# Patient Record
Sex: MALE | Race: WHITE | NOT HISPANIC OR LATINO | URBAN - METROPOLITAN AREA
[De-identification: names, ages, dates, MRNs, and addresses within clinical notes are randomized per-mention and may not be internally consistent; named-entity substitution may affect disease eponyms.]

---

## 2018-04-02 ENCOUNTER — APPOINTMENT (RX ONLY)
Dept: URBAN - METROPOLITAN AREA CLINIC 23 | Facility: CLINIC | Age: 18
Setting detail: DERMATOLOGY
End: 2018-04-02

## 2018-04-02 DIAGNOSIS — L53.8 OTHER SPECIFIED ERYTHEMATOUS CONDITIONS: ICD-10-CM

## 2018-04-02 PROBLEM — L30.9 DERMATITIS, UNSPECIFIED: Status: ACTIVE | Noted: 2018-04-02

## 2018-04-02 PROCEDURE — ? PATIENT SPECIFIC COUNSELING

## 2018-04-02 PROCEDURE — ? PRESCRIPTION

## 2018-04-02 PROCEDURE — ? DIAGNOSIS COMMENT

## 2018-04-02 PROCEDURE — ? SUNSCREEN RECOMMENDATIONS

## 2018-04-02 PROCEDURE — 99202 OFFICE O/P NEW SF 15 MIN: CPT

## 2018-04-02 RX ORDER — TRIAMCINOLONE ACETONIDE 1 MG/G
CREAM TOPICAL BID
Qty: 1 | Refills: 0 | Status: ERX | COMMUNITY
Start: 2018-04-02

## 2018-04-02 RX ADMIN — TRIAMCINOLONE ACETONIDE: 1 CREAM TOPICAL at 12:50

## 2018-04-02 NOTE — PROCEDURE: DIAGNOSIS COMMENT
Comment: Eric Owens presents today with his mother primarily for evaluation of a rash on the face that has been present over the last four months. Reportedly, he has a similar rash about 18 months prior that was felt to be consistent with SLE. To his knowledge, no biopsies or labs were performed to confirm the diagnosis. He has never seen a rheumatologist. \\n\\Ximena addition to the rash, he has noted occasional lower back pain, occasional shortness of breath (also has anxiety which he states may be causative), and intermittent chest pain. 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 He was recently in the ED where an EKG was performed and was reportedly normal. \\n\\nHe is currently on a 5 day course of prednisone. Last year he was treated with plaquenil which he tolerated, however, he ultimately discontinued it for unknown reasons.\\n\\nImpression:\\nExamination is concerning for a flare of SLE. I spoke with rheumatology today who graciously agreed to see him expeditiously. \\n\\nWill bring him back tomorrow for biopsy (routine histology and DIF). Will obtain basic labs, UA, FERMIN with IFA, MORALES, and anti dsDNA antibodies. \\n\\nWill treat topically with triamcinolone. Will also start plaquenil and low dose prednisone. Referral placed to rheumatology for management and further evaluation.
Detail Level: Simple

## 2018-04-02 NOTE — PROCEDURE: PATIENT SPECIFIC COUNSELING
Discussed referral to a rheumatologist. Will refer to Dr.Long Mei,, Fax#[phone]. Discuss Bx to rule out but will touch base with Dr.Long Mei first.
Detail Level: Zone

## 2018-04-02 NOTE — HPI: SKIN LESIONS
How Severe Is Your Skin Lesion?: moderate
treated_been_treated
Is This A New Presentation, Or A Follow-Up?: Skin Lesions
Additional History: Dr Kaur is GP, said he could use plaquenil or steroids. 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

## 2018-04-03 ENCOUNTER — APPOINTMENT (RX ONLY)
Dept: URBAN - METROPOLITAN AREA CLINIC 24 | Facility: CLINIC | Age: 18
Setting detail: DERMATOLOGY
End: 2018-04-03

## 2018-04-03 DIAGNOSIS — M32.10 SYSTEMIC LUPUS ERYTHEMATOSUS, ORGAN OR SYSTEM INVOLVEMENT UNSPECIFIED: ICD-10-CM

## 2018-04-03 PROBLEM — L30.9 DERMATITIS, UNSPECIFIED: Status: ACTIVE | Noted: 2018-04-03

## 2018-04-03 PROBLEM — L20.84 INTRINSIC (ALLERGIC) ECZEMA: Status: ACTIVE | Noted: 2018-04-03

## 2018-04-03 PROCEDURE — ? PRESCRIPTION

## 2018-04-03 PROCEDURE — ? BIOPSY BY PUNCH METHOD

## 2018-04-03 PROCEDURE — 11101: CPT

## 2018-04-03 PROCEDURE — 11100: CPT

## 2018-04-03 PROCEDURE — 99213 OFFICE O/P EST LOW 20 MIN: CPT | Mod: 25

## 2018-04-03 PROCEDURE — ? ADDITIONAL NOTES

## 2018-04-03 RX ORDER — PREDNISONE 10 MG/1
TABLET ORAL
Qty: 30 | Refills: 0 | Status: ERX | COMMUNITY
Start: 2018-04-03

## 2018-04-03 RX ORDER — HYDROXYCHLOROQUINE SULFATE 200 MG/1
TABLET ORAL BID
Qty: 60 | Refills: 1 | Status: ERX | COMMUNITY
Start: 2018-04-03

## 2018-04-03 RX ADMIN — PREDNISONE: 10 TABLET ORAL at 20:35

## 2018-04-03 RX ADMIN — HYDROXYCHLOROQUINE SULFATE: 200 TABLET ORAL at 17:22

## 2018-04-03 ASSESSMENT — LOCATION SIMPLE DESCRIPTION DERM: LOCATION SIMPLE: RIGHT CHEEK

## 2018-04-03 ASSESSMENT — LOCATION DETAILED DESCRIPTION DERM: LOCATION DETAILED: RIGHT CENTRAL MALAR CHEEK

## 2018-04-03 ASSESSMENT — LOCATION ZONE DERM: LOCATION ZONE: FACE

## 2018-04-03 NOTE — PROCEDURE: ADDITIONAL NOTES
Additional Notes: Plan : Order Tests. \\n	Test: 90325-8 - Comp C3 + C4 Pnl SerPl-mCnc\\n	Test: 25174-9 - CBC (hemogram) Bld Auto\\n	Test: 60587-5 - UA Microscopic Pnl #/area UrnS Auto\\n	Test: 1742-6 - ALT SerPl-cCnc\\n	Test: 1920-8 - AST SerPl-cCnc\\n	Test: 2160-0 - Creat SerPl-mCnc\\n	Test: 86017-2 - CBC W Auto Diff Bld\\n	Test: 09974-6 - dsDNA Ab Titr Ser\\n	Test: 98835-2 - MORALES Ab Pnl Ser\\n	Test: 74500-8 - FERMIN Pat Ser IF-Imp
Additional Notes: See note from 4/2 for full detailed report.

## 2018-04-03 NOTE — PROCEDURE: BIOPSY BY PUNCH METHOD
Wound Care: Petrolatum
Size Of Lesion In Cm (Optional): 0
Anesthesia Volume In Cc: 0.5
Anesthesia Type: 1% lidocaine with epinephrine
Billing Type: Third-Party Bill
Post-Care Instructions: I reviewed with the patient in detail post-care instructions. Patient is to keep the biopsy site dry overnight, and then apply bacitracin twice daily until healed. Patient may apply hydrogen peroxide soaks to remove any crusting.
Detail Level: Detailed
Dressing: bandage
Epidermal Sutures: 4-0 Prolene
Bill For Surgical Tray: no
Punch Size In Mm: 4
Biopsy Type: H and E
Notification Instructions: Patient will be notified of biopsy results. However, patient instructed to call the office if not contacted within 2 weeks.
Accession #: [id number]
Biopsy Type: DIF
Body Location Override (Optional - Billing Will Still Be Based On Selected Body Map Location If Applicable): right lateral malar cheek
Consent: Written consent was obtained and risks were reviewed including but not limited to scarring, infection, bleeding, scabbing, incomplete removal, nerve damage and allergy to anesthesia.
Hemostasis: None
Home Suture Removal Text: Patient was provided a home suture removal kit and will remove their sutures at home.  If they have any questions or difficulties they will call the office.
Suture Removal: 10 days
Suture Removal: 5 days

## 2018-04-06 ENCOUNTER — APPOINTMENT (RX ONLY)
Dept: URBAN - METROPOLITAN AREA CLINIC 23 | Facility: CLINIC | Age: 18
Setting detail: DERMATOLOGY
End: 2018-04-06

## 2018-04-06 DIAGNOSIS — M32.10 SYSTEMIC LUPUS ERYTHEMATOSUS, ORGAN OR SYSTEM INVOLVEMENT UNSPECIFIED: ICD-10-CM

## 2018-04-06 PROBLEM — F41.9 ANXIETY DISORDER, UNSPECIFIED: Status: ACTIVE | Noted: 2018-04-06

## 2018-04-06 PROCEDURE — 99024 POSTOP FOLLOW-UP VISIT: CPT

## 2018-04-06 PROCEDURE — ? ORDER TESTS

## 2018-04-06 NOTE — HPI: OTHER
Condition:: No visit today. Visit generated to add labs to previous visit.
Please Describe Your Condition:: Labs

## 2018-04-10 ENCOUNTER — APPOINTMENT (RX ONLY)
Dept: URBAN - METROPOLITAN AREA CLINIC 24 | Facility: CLINIC | Age: 18
Setting detail: DERMATOLOGY
End: 2018-04-10

## 2018-04-10 DIAGNOSIS — M32.10 SYSTEMIC LUPUS ERYTHEMATOSUS, ORGAN OR SYSTEM INVOLVEMENT UNSPECIFIED: ICD-10-CM

## 2018-04-10 DIAGNOSIS — Z48.01 ENCOUNTER FOR CHANGE OR REMOVAL OF SURGICAL WOUND DRESSING: ICD-10-CM

## 2018-04-10 PROBLEM — L30.9 DERMATITIS, UNSPECIFIED: Status: ACTIVE | Noted: 2018-04-10

## 2018-04-10 PROCEDURE — 99212 OFFICE O/P EST SF 10 MIN: CPT

## 2018-04-10 PROCEDURE — ? PRESCRIPTION

## 2018-04-10 PROCEDURE — ? TREATMENT REGIMEN

## 2018-04-10 PROCEDURE — ? ADDITIONAL NOTES

## 2018-04-10 PROCEDURE — ? SUTURE REMOVAL (GLOBAL PERIOD)

## 2018-04-10 RX ORDER — PIMECROLIMUS 10 MG/G
CREAM TOPICAL
Qty: 1 | Refills: 1 | Status: ERX | COMMUNITY
Start: 2018-04-10

## 2018-04-10 RX ADMIN — PIMECROLIMUS: 10 CREAM TOPICAL at 18:38

## 2018-04-10 ASSESSMENT — LOCATION SIMPLE DESCRIPTION DERM: LOCATION SIMPLE: RIGHT CHEEK

## 2018-04-10 ASSESSMENT — LOCATION DETAILED DESCRIPTION DERM: LOCATION DETAILED: RIGHT CENTRAL MALAR CHEEK

## 2018-04-10 ASSESSMENT — LOCATION ZONE DERM: LOCATION ZONE: FACE

## 2018-04-10 NOTE — PROCEDURE: ADDITIONAL NOTES
Additional Notes: Will have drawn today and already has order from 4/6/18. \\nPlan : Order Tests. \\n	Test: 65027-9 - FERMIN Pat Ser IF-Imp\\n	Test: 86902-3 - [place] 3 screen W Reflex-Imp
Additional Notes: Pending appointment with Dr.Long Valle 4/20/18. Labs reviewed with patient. Ddx includes SLE versus mixed connective tissue disease.

## 2018-04-10 NOTE — PROCEDURE: SUTURE REMOVAL (GLOBAL PERIOD)
Detail Level: Detailed
Add 00395 Cpt? (Important Note: In 2017 The Use Of 44076 Is Being Tracked By Cms To Determine Future Global Period Reimbursement For Global Periods): no

## 2018-04-10 NOTE — PROCEDURE: TREATMENT REGIMEN
Detail Level: Zone
Initiate Treatment: Elidel cream
Continue Regimen: TAC cream until Elidel is approved.\\nPlan : Prescription.  Plaquenil 200 mg tablet BID Sig: Take 1 po QD x 1 week and if tolerating well then BID.\\n prednisone 10 mg tablet  Sig: Take 1 po QD for rash.
Plan: Apply to Vaseline to crusted area on right cheek and apply SPF

## 2018-04-25 ENCOUNTER — APPOINTMENT (RX ONLY)
Dept: URBAN - METROPOLITAN AREA CLINIC 23 | Facility: CLINIC | Age: 18
Setting detail: DERMATOLOGY
End: 2018-04-25

## 2018-04-25 DIAGNOSIS — M35.1 OTHER OVERLAP SYNDROMES: ICD-10-CM | Status: IMPROVED

## 2018-04-25 DIAGNOSIS — L90.5 SCAR CONDITIONS AND FIBROSIS OF SKIN: ICD-10-CM

## 2018-04-25 PROBLEM — L30.9 DERMATITIS, UNSPECIFIED: Status: ACTIVE | Noted: 2018-04-25

## 2018-04-25 PROCEDURE — ? DIAGNOSIS COMMENT

## 2018-04-25 PROCEDURE — ? ADDITIONAL NOTES

## 2018-04-25 PROCEDURE — 99212 OFFICE O/P EST SF 10 MIN: CPT

## 2018-04-25 PROCEDURE — ? TREATMENT REGIMEN

## 2018-04-25 PROCEDURE — ? COUNSELING

## 2018-04-25 ASSESSMENT — LOCATION DETAILED DESCRIPTION DERM
LOCATION DETAILED: RIGHT SUPERIOR CENTRAL MALAR CHEEK
LOCATION DETAILED: LEFT LATERAL MALAR CHEEK
LOCATION DETAILED: RIGHT LATERAL MALAR CHEEK

## 2018-04-25 ASSESSMENT — LOCATION SIMPLE DESCRIPTION DERM
LOCATION SIMPLE: LEFT CHEEK
LOCATION SIMPLE: RIGHT CHEEK

## 2018-04-25 ASSESSMENT — LOCATION ZONE DERM: LOCATION ZONE: FACE

## 2018-04-25 NOTE — PROCEDURE: TREATMENT REGIMEN
Plan: apply SPF
Detail Level: Zone
Continue Regimen: Elidel cream,Plaquenil 200 mg tablet BID and prednisone taper. Following  Dr.Long Valle
Otc Regimen: Serica

## 2018-04-25 NOTE — PROCEDURE: DIAGNOSIS COMMENT
Comment: May refer to [facility] for scar version in the future if patient would like.
Detail Level: Simple

## 2021-01-28 ENCOUNTER — APPOINTMENT (RX ONLY)
Dept: URBAN - METROPOLITAN AREA CLINIC 23 | Facility: CLINIC | Age: 21
Setting detail: DERMATOLOGY
End: 2021-01-28

## 2021-01-28 DIAGNOSIS — K13.0 DISEASES OF LIPS: ICD-10-CM

## 2021-01-28 DIAGNOSIS — L81.4 OTHER MELANIN HYPERPIGMENTATION: ICD-10-CM

## 2021-01-28 DIAGNOSIS — L29.89 OTHER PRURITUS: ICD-10-CM

## 2021-01-28 DIAGNOSIS — L70.8 OTHER ACNE: ICD-10-CM

## 2021-01-28 DIAGNOSIS — L29.8 OTHER PRURITUS: ICD-10-CM

## 2021-01-28 DIAGNOSIS — D22 MELANOCYTIC NEVI: ICD-10-CM

## 2021-01-28 PROBLEM — F41.9 ANXIETY DISORDER, UNSPECIFIED: Status: ACTIVE | Noted: 2021-01-28

## 2021-01-28 PROBLEM — D22.5 MELANOCYTIC NEVI OF TRUNK: Status: ACTIVE | Noted: 2021-01-28

## 2021-01-28 PROBLEM — L70.0 ACNE VULGARIS: Status: ACTIVE | Noted: 2021-01-28

## 2021-01-28 PROBLEM — D22.4 MELANOCYTIC NEVI OF SCALP AND NECK: Status: ACTIVE | Noted: 2021-01-28

## 2021-01-28 PROCEDURE — ? PRESCRIPTION

## 2021-01-28 PROCEDURE — ? TREATMENT REGIMEN

## 2021-01-28 PROCEDURE — ? OTHER

## 2021-01-28 PROCEDURE — 99213 OFFICE O/P EST LOW 20 MIN: CPT

## 2021-01-28 PROCEDURE — ? COUNSELING

## 2021-01-28 RX ORDER — CLINDAMYCIN PHOSPHATE 10 MG/ML
LOTION TOPICAL
Qty: 1 | Refills: 10 | Status: ERX | COMMUNITY
Start: 2021-01-28

## 2021-01-28 RX ADMIN — CLINDAMYCIN PHOSPHATE: 10 LOTION TOPICAL at 00:00

## 2021-01-28 ASSESSMENT — LOCATION SIMPLE DESCRIPTION DERM
LOCATION SIMPLE: POSTERIOR NECK
LOCATION SIMPLE: RIGHT FOREARM
LOCATION SIMPLE: CHEST
LOCATION SIMPLE: GENITALIA
LOCATION SIMPLE: LEFT LOWER BACK
LOCATION SIMPLE: RIGHT TEMPLE
LOCATION SIMPLE: RIGHT UPPER BACK
LOCATION SIMPLE: RIGHT LIP

## 2021-01-28 ASSESSMENT — LOCATION ZONE DERM
LOCATION ZONE: GENITALIA
LOCATION ZONE: FACE
LOCATION ZONE: NECK
LOCATION ZONE: LIP
LOCATION ZONE: TRUNK
LOCATION ZONE: ARM

## 2021-01-28 ASSESSMENT — LOCATION DETAILED DESCRIPTION DERM
LOCATION DETAILED: LEFT INFERIOR MEDIAL MIDBACK
LOCATION DETAILED: RIGHT DISTAL DORSAL FOREARM
LOCATION DETAILED: LEFT INFERIOR POSTERIOR NECK
LOCATION DETAILED: RIGHT LATERAL SUPERIOR CHEST
LOCATION DETAILED: RIGHT SUPERIOR UPPER BACK
LOCATION DETAILED: RIGHT MID TEMPLE
LOCATION DETAILED: LEFT LATERAL SUPERIOR CHEST
LOCATION DETAILED: GENITALIA
LOCATION DETAILED: RIGHT LOWER CUTANEOUS LIP

## 2021-01-28 NOTE — PROCEDURE: OTHER
Other (Free Text): Will continue to monitor.
Detail Level: Zone
Note Text (......Xxx Chief Complaint.): This diagnosis correlates with the
Other (Free Text): Pt is on several medications. This may be nerve related. Recommended Cera ve anti itch.
Render Risk Assessment In Note?: no

## 2021-01-28 NOTE — PROCEDURE: TREATMENT REGIMEN
Discontinue Regimen: Nystatin cream given by pcp
Detail Level: Zone
Otc Regimen: Cetaphil \\nVanicream free and clear products